# Patient Record
Sex: MALE | Race: WHITE | HISPANIC OR LATINO
[De-identification: names, ages, dates, MRNs, and addresses within clinical notes are randomized per-mention and may not be internally consistent; named-entity substitution may affect disease eponyms.]

---

## 2021-08-02 ENCOUNTER — APPOINTMENT (OUTPATIENT)
Dept: ORTHOPEDIC SURGERY | Facility: CLINIC | Age: 71
End: 2021-08-02
Payer: MEDICARE

## 2021-08-02 ENCOUNTER — NON-APPOINTMENT (OUTPATIENT)
Age: 71
End: 2021-08-02

## 2021-08-02 VITALS — HEIGHT: 74 IN | BODY MASS INDEX: 30.16 KG/M2 | WEIGHT: 235 LBS

## 2021-08-02 PROCEDURE — 99204 OFFICE O/P NEW MOD 45 MIN: CPT | Mod: 25

## 2021-08-02 PROCEDURE — 20610 DRAIN/INJ JOINT/BURSA W/O US: CPT | Mod: LT

## 2021-08-02 PROCEDURE — 73030 X-RAY EXAM OF SHOULDER: CPT | Mod: LT

## 2021-08-02 NOTE — HISTORY OF PRESENT ILLNESS
[de-identified] : 70 year old RHD male presents complaining of left shoulder pain that started a couple of months ago. He denies injury. He notes that the pain has been intermittent. It worsened significantly on 4 days ago (7/29/21). He cannot identify a reason for this. His motion was noticeably restricted and painful. He took ibuprofen, which did not help very much. Aleve has been more beneficial. His motion has improved. He denies pain that radiates down his arm. He does not experience neck pain, numbness or tingling. He does have pain when sleeping at night. Pmhx: He takes Lisinopril, Atorvastatin and Amlodipine.

## 2021-08-02 NOTE — ADDENDUM
[FreeTextEntry1] : I, Navneet Hamilton, acted solely as a scribe for Dr. Antonio Fuentes on this date 08/02/2021.\par All medical record entries made by the Scribe were at my, Dr. Antonio Fuentes, direction and personally dictated by me on 08/02/2021. I have reviewed the chart and agree that the record accurately reflects my personal performance of the history, physical exam, assessment and plan. I have also personally directed, reviewed, and agreed with the chart.

## 2021-08-02 NOTE — PHYSICAL EXAM
[de-identified] : Constitutional\par o Appearance : well-nourished, well developed, alert, in no acute distress \par Head and Face\par o Head :\par ¦ Inspection : atraumatic, normocephalic\par Neurologic\par o Mental Status Examination :\par ¦ Orientation : alert and oriented X 3\par Psychiatric\par o Mood and Affect: mood normal, affect appropriate \par Cardiovascular\par o Observation/Palpation : - no swelling,normal pulses, normal temperature \par Lymphatic\par o Additional Nodes : No palpable lymph nodes present \par \par Cervical Spine\par o Inspection/Palpation :\par ¦ Inspection : alignment midline, normal degree of lordosis present\par ¦ Skin : normal appearance, no masses or tenderness, trachea midline\par ¦ Palpation : musculature is nontender to palpation\par o Range of Motion : arc of motion full in all planes, no crepitance or pain with ROM\par o Tests: Negative Spurling’s test \par \par Right Upper Extremity\par o Shoulder :\par ¦ Inspection/Palpation : no tenderness, swelling or deformities\par ¦ Range of Motion : full and painless in all planes, no crepitance\par ¦ Strength : forward elevation, internal rotation, external rotation, adduction and abduction 5/5\par ¦ Stability : no joint instability on provocative testing \par Tests: Johnson negative, Neer negative\par \par Left Upper Extremity\par o Shoulder :\par ¦ Inspection/Palpation : no anterior capsular or biceps tenderness, no swelling or deformities\par ¦ Range of Motion : full forward flexion with pain in the mid arc, pain in the mid arc with eccentric motions, limited internal and external rotation, no crepitance\par ¦ Strength : forward elevation, abduction and supraspinatus 4/5 limited by pain, internal rotation 5/5, external rotation 5/5, biceps/triceps 5/5\par ¦ Stability : no joint instability on provocative testing\par ¦ Tests: Johnson positive, Meghan positive, Neer negative\par \par Gait: normal gait, no significant extremity swelling or lymphedema \par \par Radiology Results \par o Left Shoulder : AP internal/external rotation and outlet views were obtained and revealed mild degenerative arthritis of the glenohumeral joint, no lytic lesions.\par \par o Left Shoulder : Indication- shoulder Impingement, Anatomic location- left subacromial space, Spray - area was sterilized with Betadine and alcohol and anesthetized with Ethyl Chloride , needle used-20G, Medications given- 5cc's lidocaine, 0.5cc's kenalog, 0.5 cc's dexamethasone, and patient tolerated it well. He demonstrated significant improvement in ROM and strength immediately after the injection.

## 2021-08-02 NOTE — DISCUSSION/SUMMARY
[de-identified] : I discussed the underlying pathophysiology of the patient's condition in great detail with the patient. I went over the patient's x-rays with them in great detail. We discussed the use of ice, Tylenol and anti-inflammatories to relieve pain. He should avoid any heavy lifting, carrying, or overhead activities. He should not lift anything higher than 90° past his shoulder level. The patient elected to receive a left shoulder subacromial cortisone injection today, and tolerated it well. I instructed the patient on ROM exercises, and told them to take it easy. The use of ice and rest was reviewed with the patient. The patient may resume activities tomorrow. I would like to see the patient back in the office in 2-3 weeks to reassess his condition. If he does not respond well to this injection then an MRI will be obtained. All of his questions were answered. He understands and consents to the plan.\par \par FU in 2-3 weeks.\par after a left shoulder subacromial cortisone injection today (08/02/2021).

## 2021-08-23 ENCOUNTER — APPOINTMENT (OUTPATIENT)
Dept: ORTHOPEDIC SURGERY | Facility: CLINIC | Age: 71
End: 2021-08-23
Payer: MEDICARE

## 2021-08-23 PROCEDURE — 99213 OFFICE O/P EST LOW 20 MIN: CPT

## 2021-08-23 NOTE — HISTORY OF PRESENT ILLNESS
[de-identified] : 70 year old RHD male presents complaining of left shoulder pain. It started a couple of months ago without injury and worsened significantly on 7/29/21. He denies pain that radiates down his arm. He denies neck pain, numbness or tingling. He received a subacromial cortisone injection on 8/2/2021 and thinks it helped significantly. He notes improved motion and claims he has no pain. He does not feel his shoulder is 100% better. He is not in PT. Pmhx: He takes Lisinopril, Atorvastatin and Amlodipine.\par \par Radiology Results taken 8/2/2021:\par o Left Shoulder : AP internal/external rotation and outlet views were obtained and revealed mild degenerative arthritis of the glenohumeral joint, no lytic lesions.

## 2021-08-23 NOTE — PHYSICAL EXAM
[de-identified] : Constitutional\par o Appearance : well-nourished, well developed, alert, in no acute distress \par Head and Face\par o Head :\par ¦ Inspection : atraumatic, normocephalic\par Neurologic\par o Mental Status Examination :\par ¦ Orientation : alert and oriented X 3\par Psychiatric\par o Mood and Affect: mood normal, affect appropriate \par Cardiovascular\par o Observation/Palpation : - no swelling,normal pulses, normal temperature \par Lymphatic\par o Additional Nodes : No palpable lymph nodes present \par \par Cervical Spine\par o Inspection/Palpation :\par ¦ Inspection : alignment midline, normal degree of lordosis present\par ¦ Skin : normal appearance, no masses or tenderness, trachea midline\par ¦ Palpation : musculature is nontender to palpation\par o Range of Motion : arc of motion full in all planes, no crepitance or pain with ROM\par o Tests: Negative Spurling’s test \par \par Right Upper Extremity\par o Shoulder :\par ¦ Inspection/Palpation : no tenderness, swelling or deformities\par ¦ Range of Motion : full and painless in all planes, no crepitance\par ¦ Strength : forward elevation, internal rotation, external rotation, adduction and abduction 5/5\par ¦ Stability : no joint instability on provocative testing \par ¦ Tests: Johnson negative, Neer negative\par \par Left Upper Extremity\par o Shoulder :\par ¦ Inspection/Palpation : no anterior capsular or biceps tenderness, no swelling or deformities\par ¦ Range of Motion : lacks 15° of full forward flexion, full internal rotation, full external rotation, no crepitance, no pain with cross chest maneuvers \par ¦ Strength : forward elevation, abduction and supraspinatus 5/5, internal rotation 5/5, external rotation 5/5, external rotation at 90° of abduction 5/5, biceps/triceps 5/5\par ¦ Stability : no joint instability on provocative testing\par ¦ Tests: Johnson negative, Meghan negative, Neer minimally positive, drop arm test negative \par \par Gait: normal gait, no significant extremity swelling or lymphedema

## 2021-08-23 NOTE — ADDENDUM
[FreeTextEntry1] : I, Navneet Hamilton, acted solely as a scribe for Dr. Antonio Fuentes on this date 08/23/2021.\par All medical record entries made by the Scribe were at my, Dr. Antonio Fuentes, direction and personally dictated by me on 08/23/2021. I have reviewed the chart and agree that the record accurately reflects my personal performance of the history, physical exam, assessment and plan. I have also personally directed, reviewed, and agreed with the chart.

## 2021-08-23 NOTE — DISCUSSION/SUMMARY
[de-identified] : I went over the pathophysiology of the patient's symptoms in great detail with the patient. The patient was instructed in ROM exercises they are to do at home. At-home strengthening, and stretching exercises were discussed and demonstrated with the patient. He should focus on light weight and high repetition exercises. He should avoid any heavy lifting, carrying, or overhead activities. He should not lift anything higher than 90° past his shoulder level. He should avoid loading his shoulder with his body weight, such as with pushups or planks. I would like to see the patient back in the office as needed to reassess his condition. All of his questions were answered. He understands and consents to the plan.\par \par FU PRN.

## 2021-09-09 ENCOUNTER — APPOINTMENT (OUTPATIENT)
Dept: OTOLARYNGOLOGY | Facility: CLINIC | Age: 71
End: 2021-09-09
Payer: MEDICARE

## 2021-09-09 VITALS
OXYGEN SATURATION: 98 % | SYSTOLIC BLOOD PRESSURE: 130 MMHG | DIASTOLIC BLOOD PRESSURE: 80 MMHG | HEIGHT: 74 IN | HEART RATE: 86 BPM | WEIGHT: 235 LBS | BODY MASS INDEX: 30.16 KG/M2 | TEMPERATURE: 98 F

## 2021-09-09 DIAGNOSIS — H93.8X1 OTHER SPECIFIED DISORDERS OF RIGHT EAR: ICD-10-CM

## 2021-09-09 DIAGNOSIS — T16.1XXA FOREIGN BODY IN RIGHT EAR, INITIAL ENCOUNTER: ICD-10-CM

## 2021-09-09 PROCEDURE — 69200 CLEAR OUTER EAR CANAL: CPT | Mod: RT

## 2021-09-09 PROCEDURE — 99203 OFFICE O/P NEW LOW 30 MIN: CPT | Mod: 25

## 2021-09-09 RX ORDER — LISINOPRIL 20 MG/1
20 TABLET ORAL
Refills: 0 | Status: ACTIVE | COMMUNITY

## 2021-09-09 RX ORDER — AMLODIPINE BESYLATE 10 MG/1
10 TABLET ORAL
Refills: 0 | Status: ACTIVE | COMMUNITY

## 2021-09-09 RX ORDER — ATORVASTATIN CALCIUM 40 MG/1
40 TABLET, FILM COATED ORAL
Refills: 0 | Status: ACTIVE | COMMUNITY

## 2021-09-09 NOTE — PHYSICAL EXAM
[Midline] : trachea located in midline position [Normal] : no rashes [de-identified] : Right ear foreign body.  Left ear clear.

## 2021-09-09 NOTE — CONSULT LETTER
[Dear  ___] : Dear  [unfilled], [Consult Letter:] : I had the pleasure of evaluating your patient, [unfilled]. [Please see my note below.] : Please see my note below. [Consult Closing:] : Thank you very much for allowing me to participate in the care of this patient.  If you have any questions, please do not hesitate to contact me. [Sincerely,] : Sincerely, [FreeTextEntry2] : Nii Pinto\par  [address], [place] [address]\par [phone]

## 2021-09-09 NOTE — HISTORY OF PRESENT ILLNESS
[de-identified] : 71 y/o M with 1.5 weeks of right ear fullness.  Pos muffled hearing.  No pain, no d/c, no tinnitus or Vertigo.

## 2021-09-09 NOTE — ASSESSMENT
[FreeTextEntry1] : Right ear Foreign body:\par - Removed today in office\par - Notes hearing and fullness sensation resolved once removed\par - F/U PRN.

## 2021-09-09 NOTE — PROCEDURE
[FreeTextEntry3] : Procedure - right ear foreign body Removal. \par After informed verbal consent is obtained, foreign body was removed from the right ear canal with a suction and alligator forceps.  Normal appearing canal following removal.\par

## 2021-09-09 NOTE — END OF VISIT
[FreeTextEntry3] : I personally saw and examined STEPHANIE AYON in detail.  I spoke to JESSICA Bauer regarding the assessment and plan of care. I performed the procedures and relevant physical exam.  I have reviewed the above assessment and plan of care and I agree.  I have made changes to the body of the note wherever necessary and appropriate.

## 2022-08-10 ENCOUNTER — APPOINTMENT (OUTPATIENT)
Dept: ORTHOPEDIC SURGERY | Facility: CLINIC | Age: 72
End: 2022-08-10

## 2022-08-10 DIAGNOSIS — M19.012 PRIMARY OSTEOARTHRITIS, LEFT SHOULDER: ICD-10-CM

## 2022-08-10 DIAGNOSIS — M75.42 IMPINGEMENT SYNDROME OF LEFT SHOULDER: ICD-10-CM

## 2022-08-10 PROCEDURE — 73030 X-RAY EXAM OF SHOULDER: CPT | Mod: LT

## 2022-08-10 PROCEDURE — 99213 OFFICE O/P EST LOW 20 MIN: CPT

## 2022-08-10 NOTE — DISCUSSION/SUMMARY
[de-identified] : I discussed the underlying pathophysiology of the patient's condition in great detail with the patient. I went over the patient's x-rays with them in great detail. We discussed the use of ice and and anti-inflammatories to relieve pain. I advised him to sleep with his arm down at the side. I do not think that he needs an injection today. If the pain does not keep improving he can return for an injection. In terms of his tailbone pain, I recommend he sits on a hemorrhoidal ring cushion. I would like to see the patient back in the office on a PRN basis to reassess his condition. All of their questions were answered. They understand and consent to the plan.\par \par FU on a PRN basis.

## 2022-08-10 NOTE — ADDENDUM
[FreeTextEntry1] : I, Navneet Hamilton, acted solely as a scribe for Dr. Antonio Fuentes on this date 08/10/2022.\par All medical record entries made by the Scribe were at my, Dr. Antonio Fuentes, direction and personally dictated by me on 08/10/2022. I have reviewed the chart and agree that the record accurately reflects my personal performance of the history, physical exam, assessment and plan. I have also personally directed, reviewed, and agreed with the chart.

## 2022-08-10 NOTE — HISTORY OF PRESENT ILLNESS
[de-identified] : 71 year old RHD male presents complaining of left shoulder pain that started 2 weeks ago after a drive to [email or web address]. He drives with his left hand on the wheel. Since coming back, his shoulder feels a little better. He was taking 3 Advil with relief. He notes the pain wakes him up from sleep if he lies on the left side. He denies any weakness or limited ROM. He denies neck pain, radiating pain down his arm, or numbness or tingling into the fingers. He was seen last August for the left shoulder and had a subacromial injection that helped dramatically. His current shoulder symptoms feels completely different. He also has complaints of tailbone pain for years when he sits. \par Pmhx significant for HTN, HLD.

## 2022-08-10 NOTE — PHYSICAL EXAM
[de-identified] : Constitutional\par o Appearance : well-nourished, well developed, alert, in no acute distress \par Head and Face\par o Head :\par ¦ Inspection : atraumatic, normocephalic\par Neurologic\par o Mental Status Examination :\par ¦ Orientation : alert and oriented X 3\par Psychiatric\par o Mood and Affect: mood normal, affect appropriate \par Cardiovascular\par o Observation/Palpation : - no swelling,normal pulses, normal temperature \par Lymphatic\par o Additional Nodes : No palpable lymph nodes present \par \par Cervical Spine\par o Inspection/Palpation :\par ¦ Inspection : alignment midline, normal degree of lordosis present\par ¦ Skin : normal appearance, no masses or tenderness, trachea midline\par ¦ Palpation : musculature is nontender to palpation\par o Range of Motion : arc of motion full in all planes, no crepitance or pain with ROM\par o Tests: Negative Spurling’s test \par \par Right Upper Extremity\par o Shoulder :\par ¦ Inspection/Palpation : no tenderness, swelling or deformities\par ¦ Range of Motion : full and painless in all planes, no crepitance\par ¦ Strength : forward elevation, internal rotation, external rotation, adduction and abduction 5/5\par ¦ Stability : no joint instability on provocative testing \par ¦ Tests: Johnson negative, Neer negative\par \par Left Upper Extremity\par o Shoulder :\par ¦ Inspection/Palpation : no anterior capsular or biceps tenderness, mild AC joint tenderness, no swelling or deformities\par ¦ Range of Motion : full forward flexion, full internal rotation, full external rotation, no crepitance, pain with cross chest maneuvers \par ¦ Strength : forward elevation 5/5, internal rotation 5/5, external rotation 5/5, external rotation at 90° of abduction 5/5, supraspinatus 5/5, adduction and abduction 5/5, biceps/triceps 5/5 \par ¦ Stability : no joint instability on provocative testing\par ¦ Tests: Johnson mildly positive, Meghan mildly positive, Neer negative, drop arm test negative \par \par Gait: normal gait, no significant extremity swelling or lymphedema \par \par Radiology Results\par o Left Shoulder : AP internal/external rotation and outlet views were obtained and reveal mild degenerative arthritis of the glenohumeral joint. Questionable minimal proximal migration of the humerus. No lytic lesions of the left shoulder.

## 2023-02-13 ENCOUNTER — APPOINTMENT (OUTPATIENT)
Dept: ORTHOPEDIC SURGERY | Facility: CLINIC | Age: 73
End: 2023-02-13
Payer: MEDICARE

## 2023-02-13 PROCEDURE — 73502 X-RAY EXAM HIP UNI 2-3 VIEWS: CPT

## 2023-02-13 PROCEDURE — 99214 OFFICE O/P EST MOD 30 MIN: CPT

## 2023-02-13 PROCEDURE — 72100 X-RAY EXAM L-S SPINE 2/3 VWS: CPT

## 2023-03-15 ENCOUNTER — APPOINTMENT (OUTPATIENT)
Dept: ORTHOPEDIC SURGERY | Facility: CLINIC | Age: 73
End: 2023-03-15
Payer: MEDICARE

## 2023-03-15 DIAGNOSIS — M16.0 BILATERAL PRIMARY OSTEOARTHRITIS OF HIP: ICD-10-CM

## 2023-03-15 PROCEDURE — 99213 OFFICE O/P EST LOW 20 MIN: CPT

## 2023-05-03 ENCOUNTER — APPOINTMENT (OUTPATIENT)
Dept: ORTHOPEDIC SURGERY | Facility: CLINIC | Age: 73
End: 2023-05-03

## 2023-05-31 ENCOUNTER — APPOINTMENT (OUTPATIENT)
Dept: ORTHOPEDIC SURGERY | Facility: CLINIC | Age: 73
End: 2023-05-31
Payer: MEDICARE

## 2023-05-31 DIAGNOSIS — M47.816 SPONDYLOSIS W/OUT MYELOPATHY OR RADICULOPATHY, LUMBAR REGION: ICD-10-CM

## 2023-05-31 DIAGNOSIS — M70.61 TROCHANTERIC BURSITIS, RIGHT HIP: ICD-10-CM

## 2023-05-31 PROCEDURE — 99213 OFFICE O/P EST LOW 20 MIN: CPT

## 2025-04-14 ENCOUNTER — OFFICE (OUTPATIENT)
Dept: URBAN - METROPOLITAN AREA CLINIC 27 | Facility: CLINIC | Age: 75
Setting detail: OPHTHALMOLOGY
End: 2025-04-14
Payer: MEDICARE

## 2025-04-14 DIAGNOSIS — H40.013: ICD-10-CM

## 2025-04-14 DIAGNOSIS — H25.13: ICD-10-CM

## 2025-04-14 PROCEDURE — 92004 COMPRE OPH EXAM NEW PT 1/>: CPT | Performed by: OPHTHALMOLOGY

## 2025-04-14 PROCEDURE — 92250 FUNDUS PHOTOGRAPHY W/I&R: CPT | Performed by: OPHTHALMOLOGY

## 2025-04-14 PROCEDURE — 76514 ECHO EXAM OF EYE THICKNESS: CPT | Performed by: OPHTHALMOLOGY

## 2025-04-14 ASSESSMENT — VISUAL ACUITY
OS_BCVA: 20/25
OD_BCVA: 20/30

## 2025-04-14 ASSESSMENT — TONOMETRY
OS_IOP_MMHG: 13
OD_IOP_MMHG: 18
OS_IOP_MMHG: 17
OD_IOP_MMHG: 15

## 2025-04-14 ASSESSMENT — REFRACTION_CURRENTRX
OD_SPHERE: +0.75
OS_SPHERE: +0.50
OD_CYLINDER: +0.50
OS_VPRISM_DIRECTION: BF
OS_CYLINDER: +0.75
OD_VPRISM_DIRECTION: BF
OD_OVR_VA: 20/
OS_OVR_VA: 20/
OS_AXIS: 006
OD_AXIS: 169

## 2025-04-14 ASSESSMENT — KERATOMETRY
OS_AXISANGLE_DEGREES: 22
OD_K1POWER_DIOPTERS: 41.75
OS_K1POWER_DIOPTERS: 41.50
OS_K2POWER_DIOPTERS: 42.50
OD_K2POWER_DIOPTERS: 41.75
OD_AXISANGLE_DEGREES: 90

## 2025-04-14 ASSESSMENT — PACHYMETRY
OD_CT_UM: 590
OS_CT_UM: 584
OD_CT_CORRECTION: -4
OS_CT_CORRECTION: -3

## 2025-04-14 ASSESSMENT — REFRACTION_AUTOREFRACTION
OD_CYLINDER: +0.50
OS_SPHERE: +0.25
OS_CYLINDER: +1.00
OS_AXIS: 005
OD_SPHERE: +0.25
OD_AXIS: 180

## 2025-04-14 ASSESSMENT — CONFRONTATIONAL VISUAL FIELD TEST (CVF)
OD_FINDINGS: FULL
OS_FINDINGS: FULL

## 2025-07-14 ENCOUNTER — OFFICE (OUTPATIENT)
Dept: URBAN - METROPOLITAN AREA CLINIC 27 | Facility: CLINIC | Age: 75
Setting detail: OPHTHALMOLOGY
End: 2025-07-14
Payer: MEDICARE

## 2025-07-14 DIAGNOSIS — H40.013: ICD-10-CM

## 2025-07-14 DIAGNOSIS — H25.13: ICD-10-CM

## 2025-07-14 PROCEDURE — 99213 OFFICE O/P EST LOW 20 MIN: CPT | Performed by: OPHTHALMOLOGY

## 2025-07-14 PROCEDURE — 92083 EXTENDED VISUAL FIELD XM: CPT | Performed by: OPHTHALMOLOGY

## 2025-07-14 PROCEDURE — 92133 CPTRZD OPH DX IMG PST SGM ON: CPT | Performed by: OPHTHALMOLOGY

## 2025-07-14 ASSESSMENT — REFRACTION_CURRENTRX
OD_CYLINDER: +0.50
OD_VPRISM_DIRECTION: BF
OS_CYLINDER: +0.75
OS_AXIS: 006
OS_VPRISM_DIRECTION: BF
OD_AXIS: 169
OD_OVR_VA: 20/
OS_OVR_VA: 20/
OS_SPHERE: +0.50
OD_SPHERE: +0.75

## 2025-07-14 ASSESSMENT — KERATOMETRY
OD_K1POWER_DIOPTERS: 41.25
OD_AXISANGLE_DEGREES: 104
OD_K2POWER_DIOPTERS: 42.25
OS_K1POWER_DIOPTERS: 41.50
OS_AXISANGLE_DEGREES: 002
OS_K2POWER_DIOPTERS: 42.50

## 2025-07-14 ASSESSMENT — CONFRONTATIONAL VISUAL FIELD TEST (CVF)
OS_FINDINGS: FULL
OD_FINDINGS: FULL

## 2025-07-14 ASSESSMENT — TONOMETRY
OS_IOP_MMHG: 16
OD_IOP_MMHG: 18
OS_IOP_MMHG: 16
OD_IOP_MMHG: 18

## 2025-07-14 ASSESSMENT — VISUAL ACUITY
OD_BCVA: 20/20
OS_BCVA: 20/20

## 2025-07-14 ASSESSMENT — REFRACTION_AUTOREFRACTION
OS_SPHERE: +0.50
OD_CYLINDER: +0.50
OD_SPHERE: 0.00
OS_AXIS: 179
OD_AXIS: 174
OS_CYLINDER: +0.75

## 2025-07-14 ASSESSMENT — PACHYMETRY
OD_CT_CORRECTION: -4
OS_CT_UM: 584
OD_CT_UM: 590
OS_CT_CORRECTION: -3